# Patient Record
Sex: FEMALE | Race: WHITE | NOT HISPANIC OR LATINO
[De-identification: names, ages, dates, MRNs, and addresses within clinical notes are randomized per-mention and may not be internally consistent; named-entity substitution may affect disease eponyms.]

---

## 2021-03-29 ENCOUNTER — APPOINTMENT (OUTPATIENT)
Dept: NEUROLOGY | Facility: CLINIC | Age: 36
End: 2021-03-29
Payer: MEDICAID

## 2021-03-29 VITALS
SYSTOLIC BLOOD PRESSURE: 123 MMHG | TEMPERATURE: 97.9 F | HEART RATE: 106 BPM | HEIGHT: 68 IN | BODY MASS INDEX: 23.79 KG/M2 | DIASTOLIC BLOOD PRESSURE: 78 MMHG | OXYGEN SATURATION: 96 % | WEIGHT: 157 LBS

## 2021-03-29 DIAGNOSIS — Z86.69 PERSONAL HISTORY OF OTHER DISEASES OF THE NERVOUS SYSTEM AND SENSE ORGANS: ICD-10-CM

## 2021-03-29 PROCEDURE — 99072 ADDL SUPL MATRL&STAF TM PHE: CPT

## 2021-03-29 PROCEDURE — 99204 OFFICE O/P NEW MOD 45 MIN: CPT

## 2021-03-29 NOTE — PHYSICAL EXAM
[FreeTextEntry1] : General:\par Constitutional:  Sitting comfortably in NAD.\par Psychiatric: well-groomed, appropriate affect, insight/judgment intact\par Ears, Nose, Throat: no abnormalities, mucus membranes moist\par Mallampati:  2/4\par Neck: supple, no lymphadenopathy or nodules palpable\par Neck Circumference:  13.5"\par Cardiovascular: regular rate and rhythm, normal S1/S2, no murmurs \par Chest: inspiratory wheezes\par Abdomen: soft, non-tender\par Extremities: no edema, clubbing or cyanosis\par \par Cognitive:\par Orientation, language, memory and knowledge screens intact.\par \par Cranial Nerves:\par II: Full to confrontation; disc margins sharp. III/IV/VI: PERRL EOMF No nystagmus\par [id number]: Symmetric, VII: Face appears symmetric VIII: Normal to screening, IX/X: Palate Elevates Symmetrical  XI: Trapezius Symmetric  XII: Tongue midline\par Motor:\par Power: 5/5 throughout, tone: normal x 4 limbs, no tremor \par Sensation:\par Intact to light touch. \par Coordination/Gait:\par Finger-nose-finger intact, normal rapid-alternating movements.\par Fine motor normal with normal rapid finger taps\par Narrow based gait, tandem forward ok\par \par Reflexes:\par DTR: 1-2+ symmetric x 4 limbs slightly worse in the right leg;\par

## 2021-03-29 NOTE — DISCUSSION/SUMMARY
[FreeTextEntry1] : Impression:\par 1) probable LOC or Central sleep apneas, but also potential for narcolepsy.\par \par Plan:\par 1) home sleep test first, but likely will need PSG/MSLT for full dx.

## 2021-03-29 NOTE — HISTORY OF PRESENT ILLNESS
[FreeTextEntry1] : \josesito Larsen is a 37 yo RHF with history of witnessed gasping and awakenings, pauses in breathing and questions of obstructive sleep apnea and excessive daytime somnolence.\par \par \par She opened the discussion speaking about her drug use, starting from college.  Initially pot and cocaine, then oxy/percocets.  After college she was off drugs for 2 years, returned for business school and found heroin at age 28.   During her opiate dependency, she would nod off during the day. \par She was sober from 2014-Jan 2020, and re-started using Jan-Mar 2020, then checked into hospital and is sober now on suboxone since then, now over a year.  Is supported by her mother.\par \par Her problem is with her eyes closing in the middle of discussions and work, and she feels it gives the appearance of using, when she is not.  Her mother notices a difference, however.\par \par Her mother notices her stopping breathing, gasping and Chava herself reports air hunger.\par Her mother has also had trouble awakening.\par nicholas Was see at the [facility] via televisit Jan 26, 2021, but has not yet had a chance to have the testing done, partly due to insurance issues.\par \par She still reports having sleep attacks.  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

## 2021-04-12 ENCOUNTER — APPOINTMENT (OUTPATIENT)
Dept: NEUROLOGY | Facility: CLINIC | Age: 36
End: 2021-04-12

## 2021-04-15 ENCOUNTER — APPOINTMENT (OUTPATIENT)
Dept: NEUROLOGY | Facility: CLINIC | Age: 36
End: 2021-04-15
Payer: MEDICAID

## 2021-04-15 PROCEDURE — 99072 ADDL SUPL MATRL&STAF TM PHE: CPT

## 2021-04-15 PROCEDURE — 95806 SLEEP STUDY UNATT&RESP EFFT: CPT

## 2021-04-16 ENCOUNTER — APPOINTMENT (OUTPATIENT)
Dept: NEUROLOGY | Facility: CLINIC | Age: 36
End: 2021-04-16

## 2021-04-28 ENCOUNTER — NON-APPOINTMENT (OUTPATIENT)
Age: 36
End: 2021-04-28

## 2021-06-03 ENCOUNTER — APPOINTMENT (OUTPATIENT)
Dept: NEUROLOGY | Facility: CLINIC | Age: 36
End: 2021-06-03
Payer: MEDICAID

## 2021-06-03 VITALS
RESPIRATION RATE: 16 BRPM | SYSTOLIC BLOOD PRESSURE: 124 MMHG | DIASTOLIC BLOOD PRESSURE: 79 MMHG | OXYGEN SATURATION: 97 % | HEART RATE: 96 BPM | TEMPERATURE: 97.5 F | BODY MASS INDEX: 23.79 KG/M2 | WEIGHT: 157 LBS | HEIGHT: 68 IN

## 2021-06-03 DIAGNOSIS — G47.39 OTHER SLEEP APNEA: ICD-10-CM

## 2021-06-03 DIAGNOSIS — R06.81 APNEA, NOT ELSEWHERE CLASSIFIED: ICD-10-CM

## 2021-06-03 DIAGNOSIS — R44.2 OTHER HALLUCINATIONS: ICD-10-CM

## 2021-06-03 PROCEDURE — 99213 OFFICE O/P EST LOW 20 MIN: CPT

## 2021-06-03 NOTE — PHYSICAL EXAM
[FreeTextEntry1] : General:\par Constitutional:  Sitting comfortably in NAD.\par Psychiatric: well-groomed, appropriate affect\par Ears, Nose, Throat: no abnormalities, mucus membranes moist\par Mallampati II\par Extremities: no edema, clubbing or cyanosis\par Skin: no rash or neuro-cutaneous signs \par \par Cognitive:\par Orientation, language, memory and knowledge screens intact.\par \par Cranial Nerves:\par II: KAREN. III/IV/VI: EOM Full.  VII: Face appears symmetric VIII: Normal to screening\par IX/X: normal phonation  XI: Trapezius Symmetric  XII: Tongue midline\par Motor:\par Power: no pronator drift\par \par Narrow based gait\par

## 2021-06-03 NOTE — HISTORY OF PRESENT ILLNESS
[FreeTextEntry1] : \josesito Larsen is a 35 yo RHF with history of witnessed gasping and awakenings, pauses in breathing.\par This was witnesed again recently by her sister, who felt that she sounded like she was dying.  It was loud, but Chava did not recall, but was told the next day.\par \par She reports having had years of feeling extremely tired through the days, and also feeling that she needs 12 hours of sleep a night.  She sets multiple alarms to be able to wake up, but still struggles.  This has been since her teens, but currently interferring with her professional life.\par \par The home sleep test was performed, but she was upright and not sleeping well for a large portion of the testing, and the O2 monitor was not on for over half the test, recording about 4 hours only.  She was anxious about her dog being sick and the results of the test itself.\par \par \par Known drug use, starting from college.  Initially pot and cocaine, then oxy/percocets.  After college she was off drugs for 2 years, returned for business school and found heroin at age 28.   During her opiate dependency, she would nod off during the day.  But she feels that the problems with excessive somnolence precededthis significantly.\par She was sober from 2014-Jan 2020, and re-started using Jan-Mar 2020, then checked into hospital and is sober now on suboxone since then, now over a year.  Is supported by her mother.\par \par Problems with her eyes closing in the middle of discussions and work, and she feels it gives the appearance of using, when she is not.  Her mother notices a difference, however.\par \par Her mother notices her stopping breathing, gasping and Chava herself reports air hunger.\par Her mother has also had trouble awakening.\par nicholas Was see at the [facility] via televisit Jan 26, 2021, but has not yet had a chance to have the testing done, partly due to insurance issues.\par \par She still reports having sleep attacks.  It seems unrelated to the amount of sleep she has had.\par She reports falling asleep in class frequently.\par No sleep paralysis.\par Several dream-hallucinations, possible hypnopompic, but only several times.\par She recalls dreaming that she went to the bathroom when she in fact ended up peeing in the bed.\par \par Sleep Routine:\par Retires:  10pm\par Latency:  quickly <10min\par [place]ousals: some \par Awakens:  10, not rested \par \par Sleep aids attempted/failed:  melatonin, quetiapine (ineffective) \par Wake-promoting agents:  cocaine, coffee\par [place]:  18\par \par No am brain fog.\par Headaches noted recently - mostly in the afternoon evening, she attributes this to work and had glasses remade and headaches better.\par No other parasomnias reported.\par \par 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

## 2021-06-03 NOTE — DISCUSSION/SUMMARY
[FreeTextEntry1] : Impression:\par 1)  excessive daytime somnolence, along with nocturnal awakenings concerning for sz vs parasomnia vs central vs obstructive sleep apnea, rare hypnopompic hallucinations and with technical insufficiency of the HST.\par \par Plan:\par 1) in-lab sleep PSG/vEEG + MSLT

## 2021-07-09 ENCOUNTER — APPOINTMENT (OUTPATIENT)
Dept: SLEEP CENTER | Facility: HOSPITAL | Age: 36
End: 2021-07-09

## 2021-07-09 ENCOUNTER — OUTPATIENT (OUTPATIENT)
Dept: OUTPATIENT SERVICES | Facility: HOSPITAL | Age: 36
LOS: 1 days | End: 2021-07-09
Payer: COMMERCIAL

## 2021-07-09 DIAGNOSIS — G47.33 OBSTRUCTIVE SLEEP APNEA (ADULT) (PEDIATRIC): ICD-10-CM

## 2021-07-09 PROCEDURE — 95810 POLYSOM 6/> YRS 4/> PARAM: CPT | Mod: 26

## 2021-07-09 PROCEDURE — 95810 POLYSOM 6/> YRS 4/> PARAM: CPT

## 2021-07-10 ENCOUNTER — OUTPATIENT (OUTPATIENT)
Dept: OUTPATIENT SERVICES | Facility: HOSPITAL | Age: 36
LOS: 1 days | End: 2021-07-10
Payer: MEDICAID

## 2021-07-10 ENCOUNTER — APPOINTMENT (OUTPATIENT)
Dept: SLEEP CENTER | Facility: HOSPITAL | Age: 36
End: 2021-07-10
Payer: MEDICAID

## 2021-07-10 ENCOUNTER — OUTPATIENT (OUTPATIENT)
Dept: OUTPATIENT SERVICES | Facility: HOSPITAL | Age: 36
LOS: 1 days | End: 2021-07-10
Payer: COMMERCIAL

## 2021-07-10 PROCEDURE — 95716 VEEG EA 12-26HR CONT MNTR: CPT

## 2021-07-10 PROCEDURE — 95720 EEG PHY/QHP EA INCR W/VEEG: CPT

## 2021-07-10 PROCEDURE — 95805 MULTIPLE SLEEP LATENCY TEST: CPT | Mod: 26

## 2021-07-12 DIAGNOSIS — G47.33 OBSTRUCTIVE SLEEP APNEA (ADULT) (PEDIATRIC): ICD-10-CM

## 2021-09-03 ENCOUNTER — APPOINTMENT (OUTPATIENT)
Dept: NEUROLOGY | Facility: CLINIC | Age: 36
End: 2021-09-03

## 2021-09-10 ENCOUNTER — NON-APPOINTMENT (OUTPATIENT)
Age: 36
End: 2021-09-10

## 2021-11-23 ENCOUNTER — APPOINTMENT (OUTPATIENT)
Dept: NEUROLOGY | Facility: CLINIC | Age: 36
End: 2021-11-23
Payer: SELF-PAY

## 2021-11-23 VITALS
TEMPERATURE: 97.8 F | SYSTOLIC BLOOD PRESSURE: 155 MMHG | HEART RATE: 110 BPM | DIASTOLIC BLOOD PRESSURE: 99 MMHG | OXYGEN SATURATION: 97 % | HEIGHT: 68 IN

## 2021-11-23 VITALS
DIASTOLIC BLOOD PRESSURE: 82 MMHG | TEMPERATURE: 98.2 F | HEIGHT: 68 IN | HEART RATE: 96 BPM | WEIGHT: 179 LBS | SYSTOLIC BLOOD PRESSURE: 131 MMHG | OXYGEN SATURATION: 97 % | BODY MASS INDEX: 27.13 KG/M2

## 2021-11-23 DIAGNOSIS — G47.19 OTHER HYPERSOMNIA: ICD-10-CM

## 2021-11-23 DIAGNOSIS — Z00.00 ENCOUNTER FOR GENERAL ADULT MEDICAL EXAMINATION W/OUT ABNORMAL FINDINGS: ICD-10-CM

## 2021-11-23 DIAGNOSIS — G47.11 IDIOPATHIC HYPERSOMNIA WITH LONG SLEEP TIME: ICD-10-CM

## 2021-11-23 PROCEDURE — 99213 OFFICE O/P EST LOW 20 MIN: CPT

## 2021-11-23 RX ORDER — UBIDECARENONE 50 MG
CAPSULE ORAL
Refills: 0 | Status: ACTIVE | COMMUNITY

## 2021-11-23 RX ORDER — IRON/IRON ASP GLY/FA/MV-MIN 38 125-25-1MG
TABLET ORAL DAILY
Refills: 0 | Status: ACTIVE | COMMUNITY

## 2021-11-23 RX ORDER — VENLAFAXINE 25 MG/1
25 TABLET ORAL
Qty: 120 | Refills: 1 | Status: ACTIVE | COMMUNITY
Start: 2021-11-23 | End: 1900-01-01

## 2021-11-23 RX ORDER — BACILLUS COAGULANS/INULIN 1B-250 MG
CAPSULE ORAL
Refills: 0 | Status: ACTIVE | COMMUNITY

## 2021-11-23 RX ORDER — BUPRENORPHINE HYDROCHLORIDE, NALOXONE HYDROCHLORIDE 8; 2 MG/1; MG/1
8-2 FILM, SOLUBLE BUCCAL; SUBLINGUAL
Refills: 0 | Status: ACTIVE | COMMUNITY

## 2021-11-30 ENCOUNTER — TRANSCRIPTION ENCOUNTER (OUTPATIENT)
Age: 36
End: 2021-11-30

## 2021-12-14 ENCOUNTER — OUTPATIENT (OUTPATIENT)
Dept: OUTPATIENT SERVICES | Facility: HOSPITAL | Age: 36
LOS: 1 days | End: 2021-12-14

## 2021-12-14 ENCOUNTER — APPOINTMENT (OUTPATIENT)
Dept: MRI IMAGING | Facility: CLINIC | Age: 36
End: 2021-12-14

## 2021-12-18 PROBLEM — G47.19 EXCESSIVE DAYTIME SLEEPINESS: Status: ACTIVE | Noted: 2021-06-03

## 2021-12-18 NOTE — HISTORY OF PRESENT ILLNESS
[FreeTextEntry1] : \josesito Larsen is a 37 yo RHF with history of witnessed gasping and awakenings, pauses in breathing and questions of obstructive sleep apnea and excessive daytime somnolence.\par \josesito Last seen Nitin 3, 2021. \par \par Symptoms continue.\josesito Still having idiopathic hypersomnia - which is not tightly temporally related to sleep.\josesito Went on a trip with her sister, and there were 3 awakenings with gasping that her sister knew about them, but usually she does not know them herself unless she makes a lot of noise.  She may sit up suddenly with them.\par \par Her mom accompanied her today - and described the sleep attacks.  With the appearance of when she once did drugs, but she is no longer doing drugs.  Per Chava, that sensation feels different, as she is more aware during the events.\par \par She has gained weight, and is self-conscious about her weight, and not going out as much as well on top of the sleep attacks.\par \par The PSG/MSLT was performed July 9, 2021.\par \par \par \par She reports having had years of feeling extremely tired through the days, and also feeling that she needs 12 hours of sleep a night.  She sets multiple alarms to be able to wake up, but still struggles.  This has been since her teens.\par \par The home sleep test was performed, but she was upright and not sleeping well for a large portion of the testing, and the O2 monitor was not on for over half the test, recording about 4 hours only.  She was anxious about her dog being sick and the results of the test itself.\par \par Known drug use, starting from college.  Initially pot and cocaine, then oxy/percocets.  After college she was off drugs for 2 years, returned for business school and found heroin at age 28.   During her opiate dependency, she would nod off during the day. \par She was sober from 2014-Jan 2020, and re-started using Jan-Mar 2020, then checked into hospital and is sober now on suboxone since then, now over a year.  Is supported by her mother.\par \par Her problem is with her eyes closing in the middle of discussions and work, and she feels it gives the appearance of using, when she is not.  Her mother notices a difference, however.\par \par Her mother notices her stopping breathing, gasping and Chava herself reports air hunger.\par Her mother has also had trouble awakening.\par \josesito Was see at the [facility] via televisit Jan 26, 2021, but has not yet had a chance to have the testing done, partly due to insurance issues.\par \par She still reports having sleep attacks.  It seems unrelated to the amount of sleep she has had.\par She reports falling asleep in class frequently.\par No sleep paralysis.\par Several dream-hallucinations, possible hypnopompic, but only several times.\par She recalls dreaming that she went to the bathroom when she in fact ended up peeing in the bed.\par \par Sleep Routine:\par Retires:  10pm\par Latency:  quickly <10min\par [place]: some \par Awakens:  10, not rested \par \par Sleep aids attempted/failed:  melatonin, quetiapine (ineffective) \par Wake-promoting agents:  cocaine, coffee\par [place]:  18\par \par \par No am brain fog.\par Headaches noted recently - mostly in the afternoon evening, she attributes this to work and had glasses remade and headaches better.\par No other parasomnias reported.\par \par 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

## 2021-12-18 NOTE — DISCUSSION/SUMMARY
[FreeTextEntry1] : Impression:\par 1) severe hypersomnia, idiopathic.  Cases reported of autoimmune hypersomnias and for intracerebral lesions associated with hypersomnia so will screen both.\par \par \par Plan:\par 1) MRI brain\par 2) serology for autoimmune cause for hypersomnia

## 2021-12-18 NOTE — PHYSICAL EXAM
[FreeTextEntry1] : General:\par Constitutional:  Sitting comfortably in NAD.\par Psychiatric: well-groomed, appropriate affect\par Ears, Nose, Throat: no abnormalities, mucus membranes moist\par Neck: supple\par Extremities: no edema, clubbing or cyanosis\par Skin: no rash or neuro-cutaneous signs \par \par Cognitive:\par Orientation, language, memory and knowledge screens intact.\par \par Cranial Nerves:\par II: KAREN. III/IV/VI: EOM Full.  VII: Face appears symmetric VIII: Normal to screening\par IX/X: normal phonation  XI: Trapezius Symmetric  XII: Tongue midline\par Motor:\par Power: no pronator drift\par \par Narrow based gait\par \par \par \par

## 2022-01-25 ENCOUNTER — TRANSCRIPTION ENCOUNTER (OUTPATIENT)
Age: 37
End: 2022-01-25

## 2022-03-01 ENCOUNTER — TRANSCRIPTION ENCOUNTER (OUTPATIENT)
Age: 37
End: 2022-03-01

## 2022-03-25 ENCOUNTER — LABORATORY RESULT (OUTPATIENT)
Age: 37
End: 2022-03-25

## 2022-04-08 LAB
25(OH)D3 SERPL-MCNC: 9.9 NG/ML
A-TUMOR NECROSIS FACT SERPL-MCNC: 6.9 PG/ML
ACE BLD-CCNC: 62 U/L
ALBUMIN MFR SERPL ELPH: 62.3 %
ALBUMIN SERPL ELPH-MCNC: 4.9 G/DL
ALBUMIN SERPL-MCNC: 4.2 G/DL
ALBUMIN/GLOB SERPL: 1.6 RATIO
ALP BLD-CCNC: 86 U/L
ALPHA1 GLOB MFR SERPL ELPH: 4.7 %
ALPHA1 GLOB SERPL ELPH-MCNC: 0.3 G/DL
ALPHA2 GLOB MFR SERPL ELPH: 9.1 %
ALPHA2 GLOB SERPL ELPH-MCNC: 0.6 G/DL
ALT SERPL-CCNC: 18 U/L
ANA SER IF-ACNC: NEGATIVE
ANION GAP SERPL CALC-SCNC: 13 MMOL/L
AST SERPL-CCNC: 16 U/L
B-GLOBULIN MFR SERPL ELPH: 11.9 %
B-GLOBULIN SERPL ELPH-MCNC: 0.8 G/DL
B2 GLYCOPROT1 AB SER QL: NEGATIVE
BASOPHILS # BLD AUTO: 0.05 K/UL
BASOPHILS NFR BLD AUTO: 0.7 %
BILIRUB SERPL-MCNC: <0.2 MG/DL
BUN SERPL-MCNC: 14 MG/DL
C3 SERPL-MCNC: 117 MG/DL
C4 SERPL-MCNC: 28 MG/DL
CALCIUM SERPL-MCNC: 9.5 MG/DL
CH50 SERPL-MCNC: 64 U/ML
CHLORIDE SERPL-SCNC: 103 MMOL/L
CK SERPL-CCNC: 47 U/L
CO2 SERPL-SCNC: 27 MMOL/L
COVID-19 NUCLEOCAPSID  GAM ANTIBODY INTERPRETATION: POSITIVE
COVID-19 SPIKE DOMAIN ANTIBODY INTERPRETATION: POSITIVE
CREAT SERPL-MCNC: 0.6 MG/DL
CRP SERPL-MCNC: <3 MG/L
EGFR: 118 ML/MIN/1.73M2
ENA SCL70 IGG SER IA-ACNC: <0.2 AL
ENA SS-A AB SER IA-ACNC: <0.2 AL
ENA SS-B AB SER IA-ACNC: <0.2 AL
EOSINOPHIL # BLD AUTO: 0.14 K/UL
EOSINOPHIL NFR BLD AUTO: 2 %
ERYTHROCYTE [SEDIMENTATION RATE] IN BLOOD BY WESTERGREN METHOD: 21 MM/HR
ESTIMATED AVERAGE GLUCOSE: 100 MG/DL
FERRITIN SERPL-MCNC: 21 NG/ML
FOLATE SERPL-MCNC: 4 NG/ML
GAMMA GLOB FLD ELPH-MCNC: 0.8 G/DL
GAMMA GLOB MFR SERPL ELPH: 12 %
GLUCOSE SERPL-MCNC: 99 MG/DL
HBA1C MFR BLD HPLC: 5.1 %
HCT VFR BLD CALC: 43.5 %
HGB BLD-MCNC: 13.6 G/DL
IGNF SERPL-MCNC: <4.2 PG/ML
IL10 SERPL-MCNC: 3.1 PG/ML
IL12 SERPL-MCNC: <1.9 PG/ML
IL13 SERPL-MCNC: 8.5 PG/ML
IL17A SERPL-MCNC: <1.4 PG/ML
IL2 SERPL-MCNC: 760.6 PG/ML
IL2 SERPL-MCNC: <2.1 PG/ML
IL4 SERPL-MCNC: <2.2 PG/ML
IL6 SERPL-MCNC: 2.2 PG/ML
IL8 SERPL-MCNC: <3 PG/ML
IMM GRANULOCYTES NFR BLD AUTO: 0.1 %
INTERLEUKIN 1 BETA: <6.5 PG/ML
INTERLEUKIN 5: <2.1 PG/ML
INTERPRETATION SERPL IEP-IMP: NORMAL
LYMPHOCYTES # BLD AUTO: 1.9 K/UL
LYMPHOCYTES NFR BLD AUTO: 27 %
MAGNESIUM SERPL-MCNC: 2.1 MG/DL
MAN DIFF?: NORMAL
MCHC RBC-ENTMCNC: 28.8 PG
MCHC RBC-ENTMCNC: 31.3 GM/DL
MCV RBC AUTO: 92.2 FL
MONOCYTES # BLD AUTO: 0.54 K/UL
MONOCYTES NFR BLD AUTO: 7.7 %
NEUTROPHILS # BLD AUTO: 4.39 K/UL
NEUTROPHILS NFR BLD AUTO: 62.5 %
PARANEOPLASTIC AB PNL SER: NORMAL
PLATELET # BLD AUTO: 209 K/UL
POTASSIUM SERPL-SCNC: 4.5 MMOL/L
PROT SERPL-MCNC: 6.8 G/DL
PROT SERPL-MCNC: 6.8 G/DL
RBC # BLD: 4.72 M/UL
RBC # FLD: 13.5 %
RHEUMATOID FACT SER QL: <10 IU/ML
SARS-COV-2 AB SERPL IA-ACNC: >250 U/ML
SARS-COV-2 AB SERPL QL IA: 1.21 INDEX
SODIUM SERPL-SCNC: 143 MMOL/L
THYROGLOB AB SERPL-ACNC: <20 IU/ML
THYROPEROXIDASE AB SERPL IA-ACNC: <10 IU/ML
TSH SERPL-ACNC: 2.3 UIU/ML
VIT B12 SERPL-MCNC: 444 PG/ML
WBC # FLD AUTO: 7.03 K/UL

## 2022-04-12 ENCOUNTER — APPOINTMENT (OUTPATIENT)
Dept: NEUROLOGY | Facility: CLINIC | Age: 37
End: 2022-04-12
Payer: MEDICAID

## 2022-04-12 VITALS
WEIGHT: 175 LBS | BODY MASS INDEX: 26.52 KG/M2 | HEART RATE: 92 BPM | HEIGHT: 68 IN | OXYGEN SATURATION: 96 % | TEMPERATURE: 97.9 F | SYSTOLIC BLOOD PRESSURE: 125 MMHG | DIASTOLIC BLOOD PRESSURE: 81 MMHG

## 2022-04-12 PROCEDURE — 99214 OFFICE O/P EST MOD 30 MIN: CPT

## 2022-04-12 RX ORDER — FOLIC ACID 1 MG/1
1 TABLET ORAL DAILY
Qty: 60 | Refills: 5 | Status: ACTIVE | COMMUNITY
Start: 2022-04-12 | End: 1900-01-01

## 2022-04-12 NOTE — DISCUSSION/SUMMARY
[FreeTextEntry1] : Impression:\par 1) severe hypersomnia, idiopathic.\par 2) likely attention deficit hyperactivity disorder in addition\par 3) high anxiety, PTSD likely related to prior experiences, in therapy currently.\par \par Plan:\par 1) start venlafaxine at 25mg/d, and push up as tolerated.\par 2) allergist referral.\par

## 2022-04-12 NOTE — PHYSICAL EXAM
[FreeTextEntry1] : General:\par Constitutional:  Sitting comfortably in NAD.\par Psychiatric: well-groomed, appropriate affect\par Ears, Nose, Throat: no abnormalities, mucus membranes moist\par Neck: supple\par Extremities: no edema, clubbing or cyanosis\par Skin: no rash or neuro-cutaneous signs \par \par Cognitive:\par Orientation, language, memory and knowledge screens intact.\par \par Cranial Nerves:\par II: KAREN. III/IV/VI: EOM Full.  VII: Face appears symmetric VIII: Normal to screening\par IX/X: normal phonation  XI: Trapezius Symmetric  XII: Tongue midline\par Motor:\par Power: no pronator drift\par \par Narrow based gait

## 2022-04-12 NOTE — HISTORY OF PRESENT ILLNESS
[FreeTextEntry1] : Chava is a 35 yo RHF with history of witnessed gasping and awakenings, pauses in breathing and idiopathic hypersomnia.  Her mom accompanied her again today.\par \par Last seen Nov 23, 2021. 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  Sleep attacks continue.\par When active less likely to occur.\par \par \par She mentioned weight, and is self-conscious about her weight, and not going out as much as well on top of the sleep attacks.\par \par \par Prior drug use, starting from college.  Initially pot and cocaine, then oxy/percocets.  After college she was off drugs for 2 years, returned for business school and found heroin at age 28.   During her opiate dependency, she would nod off during the day. \par She was sober from 2014-Jan 2020, and re-started using Jan-Mar 2020, then checked into hospital and is sober now on suboxone since then, now over a year.  Is supported by her mother.\par \par Her problem is with her eyes closing in the middle of discussions and work, and she feels it gives the appearance of using, when she is not.  Her mother notices a difference, however.\par \par She still reports having sleep attacks.  It seems unrelated to the amount of sleep she has had.\par She reports falling asleep in class frequently.\par No sleep paralysis.\par Several dream-hallucinations, possible hypnopompic, but only several times.\par She recalls dreaming that she went to the bathroom when she in fact ended up peeing in the bed.\par \par Sleep Routine:\par Retires:  10pm\par Latency:  quickly <10min\par [place]: some \par Awakens:  10, not rested \par \par Sleep aids attempted/failed:  melatonin, quetiapine (ineffective) \par Wake-promoting agents:  cocaine, coffee\par [place]:  18\par \par Headaches noted recently - mostly in the afternoon evening, she attributes this to work and had glasses remade and headaches better.\par No other parasomnias reported.\par \par 2014: going into sepsis frequently, required lung surgery, pneumonectomy\par